# Patient Record
Sex: MALE | Race: WHITE | Employment: FULL TIME | URBAN - METROPOLITAN AREA
[De-identification: names, ages, dates, MRNs, and addresses within clinical notes are randomized per-mention and may not be internally consistent; named-entity substitution may affect disease eponyms.]

---

## 2019-04-18 ENCOUNTER — OFFICE VISIT (OUTPATIENT)
Dept: ORTHOPEDIC SURGERY | Facility: CLINIC | Age: 54
End: 2019-04-18

## 2019-04-18 VITALS
RESPIRATION RATE: 16 BRPM | HEART RATE: 94 BPM | HEIGHT: 73 IN | OXYGEN SATURATION: 94 % | SYSTOLIC BLOOD PRESSURE: 143 MMHG | BODY MASS INDEX: 29.77 KG/M2 | WEIGHT: 224.6 LBS | TEMPERATURE: 97.5 F | DIASTOLIC BLOOD PRESSURE: 98 MMHG

## 2019-04-18 DIAGNOSIS — S43.401A SPRAIN OF RIGHT SHOULDER, UNSPECIFIED SHOULDER SPRAIN TYPE, INITIAL ENCOUNTER: ICD-10-CM

## 2019-04-18 DIAGNOSIS — S73.101A SPRAIN OF RIGHT HIP, INITIAL ENCOUNTER: ICD-10-CM

## 2019-04-18 DIAGNOSIS — M25.552 ACUTE HIP PAIN, LEFT: ICD-10-CM

## 2019-04-18 DIAGNOSIS — M25.512 ACUTE PAIN OF LEFT SHOULDER: ICD-10-CM

## 2019-04-18 DIAGNOSIS — S43.402A SPRAIN OF LEFT SHOULDER, UNSPECIFIED SHOULDER SPRAIN TYPE, INITIAL ENCOUNTER: ICD-10-CM

## 2019-04-18 DIAGNOSIS — S13.9XXA NECK SPRAIN, INITIAL ENCOUNTER: ICD-10-CM

## 2019-04-18 DIAGNOSIS — S33.5XXA LUMBAR SPRAIN, INITIAL ENCOUNTER: ICD-10-CM

## 2019-04-18 DIAGNOSIS — S73.102A HIP SPRAIN, LEFT, INITIAL ENCOUNTER: ICD-10-CM

## 2019-04-18 DIAGNOSIS — M25.532 WRIST PAIN, LEFT: Primary | ICD-10-CM

## 2019-04-18 NOTE — LETTER
4/18/2019 3:15 PM 
 
Mr. Polo Aldana [address] [facility] [address] THE ABOVE PATIENT WAS SEEN TODAY AND MAY CONTINUE FULL WORK ACTIVITIES.  
 
 
Sincerely, 
 
 
Jairo Barker MD
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
## 2019-05-02 ENCOUNTER — HOSPITAL ENCOUNTER (OUTPATIENT)
Dept: PHYSICAL THERAPY | Age: 54
Discharge: HOME | End: 2019-05-02
Payer: COMMERCIAL

## 2019-05-02 PROCEDURE — 97110 THERAPEUTIC EXERCISES: CPT

## 2019-05-02 PROCEDURE — 97165 OT EVAL LOW COMPLEX 30 MIN: CPT

## 2019-05-02 PROCEDURE — 97035 APP MDLTY 1+ULTRASOUND EA 15: CPT
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
## 2019-05-08 ENCOUNTER — HOSPITAL ENCOUNTER (OUTPATIENT)
Dept: PHYSICAL THERAPY | Age: 54
Discharge: HOME | End: 2019-05-08
Payer: COMMERCIAL

## 2019-05-08 PROCEDURE — 97110 THERAPEUTIC EXERCISES: CPT

## 2019-05-08 PROCEDURE — 97140 MANUAL THERAPY 1/> REGIONS: CPT

## 2019-05-08 PROCEDURE — 97035 APP MDLTY 1+ULTRASOUND EA 15: CPT

## 2019-05-08 NOTE — PROGRESS NOTES
OT DAILY TREATMENT NOTE 10-18 Patient Name: Gino Corrales Date:2019 : 1965 [x]  Patient  Verified Payor: Yodit Seals / Plan: [address] [place] [address] Rd PT / Product Type: Commerical / In time:100  Out time:145 Total Treatment Time (min): 45 Visit #: 2 of 10 Treatment Area: Pain in left wrist [M25.532] SUBJECTIVE Pain Level (0-10 scale): 4-5/10 Any medication changes, allergies to medications, adverse drug reactions, diagnosis change, or new procedure performed?: [x] No    [] Yes (see summary sheet for update) Subjective functional status/changes:   [] No changes reported Numbness has decreased, still with sharp pain on thumb side of volar forearm running to elbow, mostly when lifting, sometimes out of nowhere OBJECTIVE Modality rationale: decrease inflammation and decrease pain to improve the patients ability to move wrist without pain Min Type Additional Details  
 [] Estim:  []Unatt       []IFC  []Premod []Other:  []w/ice   []w/heat Position: Location:  
 [] Estim: []Att    []TENS instruct  []NMES []Other:  []w/US   []w/ice   []w/heat Position: Location:  
 []  Traction: [] Cervical       []Lumbar 
                     [] Prone          []Supine []Intermittent   []Continuous Lbs: 
[] before manual 
[] after manual  
8 [x]  Ultrasound: []Continuous   [x] Pulsed 50%            []1MHz   [x]3MHz W/cm2:1.0 Location:volar wrist , thumb snuff left  
 []  Iontophoresis with dexamethasone Location: [] Take home patch  
[] In clinic  
 []  Ice     []  heat 
[]  Ice massage 
[]  Laser  
[]  Anodyne Position: Location:  
 []  Laser with stim 
[]  Other:  Position: Location:  
 []  Vasopneumatic Device Pressure:       [] lo [] med [] hi  
Temperature: [] lo [] med [] hi  
 
 
[x] Skin assessment post-treatment:  [x]intact []redness- no adverse reaction 
  []redness  adverse reaction: 29 min Therapeutic Exercise:  [] See flow sheet :  
Rationale: increase ROM to improve the patients ability to grasp Thumb ROM HEp 10 reps each Wrist mobilizer x 10 each way Wrist mazes 5x each 8 min Manual Therapy:  Instrument assisted forearm Rationale: decrease pain, increase ROM and increase tissue extensibility to improve pain free wrist and forearm ROM With 
 [] TE 
 [] TA 
 [] neuro 
 [] other: Patient Education: [x] Review HEP [] Progressed/Changed HEP based on: thumb HEP [] positioning   [] body mechanics   [] transfers   [] heat/ice application  
[] Splint wear/care   [] Sensory re-education   [] scar management     
[] other:   
 
     
Other Objective/Functional Measures:  
Numbness decreased Pain Level (0-10 scale) post treatment: 5-6/10 ASSESSMENT/Changes in Function: Fatigues with exercises Patient will continue to benefit from skilled OT services to modify and progress therapeutic interventions, address ROM deficits, address strength deficits, analyze and address soft tissue restrictions and instruct in home and community integration to attain remaining goals. []  See Plan of Care 
[]  See progress note/recertification 
[]  See Discharge Summary Progress towards goals / Updated goals: *1.  Patient will be independent in home exercise program for nerve gliding and wrist and thumb ROM. progressing thumb and nerve gliding 5/8/19 2. Patient will report pain in wrists reduced with use of modalities in clinic.not met today 5/8/19 3. Patient will report 50% decrease in episodes of numbness during tasks at hoem. 
  
Long Term Goals: To be accomplished in 10 treatments: 1.  Patient will report pain 0-2/10 with routine tasks. 2.  Patient will report improved use of hand for lifting and carrying as  shown by increase in FOTO of at least 15 points. 3.  Patient will improve left hand  atleast  
 
** PLAN 
 []  Upgrade activities as tolerated     [x]  Continue plan of care 
[]  Update interventions per flow sheet      
[]  Discharge due to:_ 
[]  Other:_ OTILIO Abel 5/8/2019  1:31 PM 
 
Future Appointments Date Time Provider Mariela Henderson 5/10/2019  2:45 PM Francisco Cunningham [facility]  
5/14/2019 10:30 AM KRISTINE Bryant/JESÚS [facility]

## 2019-05-10 ENCOUNTER — HOSPITAL ENCOUNTER (OUTPATIENT)
Dept: PHYSICAL THERAPY | Age: 54
Discharge: HOME | End: 2019-05-10
Payer: COMMERCIAL

## 2019-05-10 PROCEDURE — 97140 MANUAL THERAPY 1/> REGIONS: CPT

## 2019-05-10 PROCEDURE — 97035 APP MDLTY 1+ULTRASOUND EA 15: CPT

## 2019-05-10 PROCEDURE — 97110 THERAPEUTIC EXERCISES: CPT

## 2019-05-10 NOTE — PROGRESS NOTES
OT DAILY TREATMENT NOTE 10-18 Patient Name: Felisa Looney Date:5/10/2019 : 1965 [x]  Patient  Verified Payor: Theodore Cortezhire / Plan: [address] [place] [address] Rd PT / Product Type: Commerical / In time:245  Out time:330 Total Treatment Time (min): 45 Visit #: 3 of 10 Treatment Area: Pain in left wrist [M25.532] SUBJECTIVE Pain Level (0-10 scale): 5-6/10 Any medication changes, allergies to medications, adverse drug reactions, diagnosis change, or new procedure performed?: [x] No    [] Yes (see summary sheet for update) Subjective functional status/changes:   [] No changes reported Decrease in tingling per Patient report OBJECTIVE Modality rationale: decrease inflammation, decrease pain and increase tissue extensibility to improve the patients ability to  Min Type Additional Details  
 [] Estim:  []Unatt       []IFC  []Premod []Other:  []w/ice   []w/heat Position: Location:  
 [] Estim: []Att    []TENS instruct  []NMES []Other:  []w/US   []w/ice   []w/heat Position: Location:  
 []  Traction: [] Cervical       []Lumbar 
                     [] Prone          []Supine []Intermittent   []Continuous Lbs: 
[] before manual 
[] after manual  
8 [x]  Ultrasound: []Continuous   [x] Pulsed-50%                         []1MHz   [x]3MHz Location: [facility] 
W/cm2: 1.0 []  Iontophoresis with dexamethasone Location: [] Take home patch  
[] In clinic  
 []  Ice     []  heat 
[]  Ice massage 
[]  Laser  
[]  Anodyne Position: Location:  
 []  Laser with stim 
[]  Other: Position: Location:  
 []  Vasopneumatic Device Pressure:       [] lo [] med [] hi  
Temperature: [] lo [] med [] hi  
[x] Skin assessment post-treatment:  [x]intact []redness- no adverse reaction 
  []redness  adverse reaction:  
 
10 min Therapeutic Exercise:  [] See flow sheet :  
Rationale: increase ROM and increase strength to improve the patients ability to  Wrist Mazes: 5x each Reviewed HEP with Pt demo 15 min Manual Therapy:  IASTM Rationale: decrease pain, increase ROM, increase tissue extensibility and decrease edema  to improve ability to functionally use hand IASTM to [facility]/Forearm Trigger point release With 
 [] TE 
 [] TA 
 [] neuro 
 [] other: Patient Education: [x] Review HEP [] Progressed/Changed HEP based on:  
[] positioning   [] body mechanics   [] transfers   [] heat/ice application  
[] Splint wear/care   [] Sensory re-education   [] scar management     
[] other:   
 
     
Other Objective/Functional Measures:  
 
Decreased knot in forearm post IASTM Pain Level (0-10 scale) post treatment: 5-6/10 ASSESSMENT/Changes in Function: decrease with tingling sensation, no change in pain with modalities Patient will continue to benefit from skilled OT services to modify and progress therapeutic interventions, address ROM deficits, address strength deficits and instruct in home and community integration to attain remaining goals. []  See Plan of Care 
[]  See progress note/recertification 
[]  See Discharge Summary Progress towards goals / Updated goals: 1.  Patient will be independent in home exercise program for nerve gliding and wrist and thumb ROM. progressing thumb and nerve gliding 5/8/19 2.  Patient will report pain in wrists reduced with use of modalities in clinic.not met today 5/10/19 3.  Patient will report 50% decrease in episodes of numbness during tasks at [facility]. Met per Pt report, check for consistency 5/10/19 
  
Long Term Goals: To be accomplished in 10 treatments:             
1.  Patient will report pain 0-2/10 with routine tasks. 2.  Patient will report improved use of hand for lifting and carrying as  shown by increase in FOTO of at least 15 points. 3.  Patient will improve left hand  atleast  
 
 
 
PLAN 
[]  Upgrade activities as tolerated     [x]  Continue plan of care []  Update interventions per flow sheet      
[]  Discharge due to:_ 
[]  Other:_ AKUA Li 5/10/2019  1:48 PM 
 
Future Appointments Date Time Provider Mariela Henderson 5/14/2019 10:30 AM Gene Ray NJDEEDL [facility]  
5/17/2019 10:30 AM Gene Ray [id number] [facility]

## 2019-05-14 ENCOUNTER — HOSPITAL ENCOUNTER (OUTPATIENT)
Dept: PHYSICAL THERAPY | Age: 54
Discharge: HOME | End: 2019-05-14
Payer: COMMERCIAL

## 2019-05-14 PROCEDURE — 97110 THERAPEUTIC EXERCISES: CPT

## 2019-05-14 PROCEDURE — 97035 APP MDLTY 1+ULTRASOUND EA 15: CPT

## 2019-05-14 PROCEDURE — 97018 PARAFFIN BATH THERAPY: CPT

## 2019-05-14 NOTE — PROGRESS NOTES
OT DAILY TREATMENT NOTE 10-18 Patient Name: Salena Curling Date:2019 : 1965 [x]  Patient  Verified Payor: Yusuf Cuenca / Plan: [address] [place] [address] Rd PT / Product Type: Commerical / In time:1030  Out time:1125 Total Treatment Time (min): 55 Visit #: 4 of 10 Treatment Area: Pain in left wrist [M25.532] SUBJECTIVE Pain Level (0-10 scale): 3-10 Any medication changes, allergies to medications, adverse drug reactions, diagnosis change, or new procedure performed?: [x] No    [] Yes (see summary sheet for update) Subjective functional status/changes:   [] No changes reported I tried to do a push up and it didn't work out very well OBJECTIVE 
  
     
Modality rationale: decrease inflammation, decrease pain and increase tissue extensibility to improve the patients ability to  Min Type Additional Details  
  [] Estim:  []Unatt       []IFC  []Premod []Other:  []w/ice   []w/heat Position: Location:  
  [] Estim: []Att    []TENS instruct  []NMES []Other:  []w/US   []w/ice   []w/heat Position: Location:  
  []  Traction: [] Cervical       []Lumbar 
                     [] Prone          []Supine []Intermittent   []Continuous Lbs: 
[] before manual 
[] after manual  
8 [x]  Ultrasound: []Continuous   [x] Pulsed-50%                         []1MHz   [x]3MHz Location: [facility] 
W/cm2: 1.0  
  []  Iontophoresis with dexamethasone Location: [] Take home patch  
[] In clinic  
  []  Ice     []  heat 
[]  Ice massage 
[]  Laser  
[]  Anodyne Position: Location:  
  []  Laser with stim 
[]  Other: Position: Location:  
  []  Vasopneumatic Device Pressure:       [] lo [] med [] hi  
Temperature: [] lo [] med [] hi  
[x] Skin assessment post-treatment:  [x]intact []redness- no adverse reaction []redness  adverse reaction:  
  
30 min Therapeutic Exercise:  [] See flow sheet :  
 Rationale: increase ROM and increase strength to improve the patients ability to , grasp, lift Median Glides 5x Wrist Mzzes: 10x each Wrist Mobilizer: on Wall: 20x each way Review HEP for thumb Blue Therabar: Supination/Pronation 10x each, Flexion/Extension with Green Therabar 10x each TRX: Modified Pushups 5x 10 min Manual Therapy:  IASTM Rationale: decrease pain, increase ROM, increase tissue extensibility and decrease trigger points to improve  IASTM to [facility]/Wrist/Thumb/Forearm With 
 [] TE 
 [] TA 
 [] neuro 
 [] other: Patient Education: [x] Review HEP [] Progressed/Changed HEP based on:  
[] positioning   [] body mechanics   [] transfers   [] heat/ice application  
[] Splint wear/care   [] Sensory re-education   [] scar management     
[] other:   
 
     
Other Objective/Functional Measures:  
 
Reported pain with flexion/extension with Blue therabar Pain Level (0-10 scale) post treatment: 5/10 ASSESSMENT/Changes in Function: strength improving Patient will continue to benefit from skilled OT services to modify and progress therapeutic interventions, address ROM deficits, address strength deficits and instruct in home and community integration to attain remaining goals. []  See Plan of Care 
[]  See progress note/recertification 
[]  See Discharge Summary Progress towards goals / Updated goals: 1.  Patient will be independent in home exercise program for nerve gliding and wrist and thumb ROM. Met 5/14/19 2.  Patient will report pain in wrists reduced with use of modalities in clinic.not met today 5/10/19, 5/14/19 3.  Patient will report 50% decrease in episodes of numbness during tasks at [facility] Met per Pt report, check for consistency 5/10/19 
  
Long Term Goals: To be accomplished in 10 treatments:             
1.  Patient will report pain 0-2/10 with routine tasks.  
2.  Patient will report improved use of hand for lifting and carrying as  shown by increase in FOTO of at least 15 points. 3.  Patient will improve left hand  atleast  
 
 
 
PLAN 
[]  Upgrade activities as tolerated     [x]  Continue plan of care 
[]  Update interventions per flow sheet      
[]  Discharge due to:_ 
[]  Other:_ AKUA Crain 5/14/2019  8:05 AM 
 
Future Appointments Date Time Provider Mariela Henderson 5/14/2019 10:30 AM Momo Liriano [id number] [facility]  
5/17/2019 10:30 AM Momo [id number] [facility]

## 2019-05-17 ENCOUNTER — HOSPITAL ENCOUNTER (OUTPATIENT)
Dept: PHYSICAL THERAPY | Age: 54
Discharge: HOME | End: 2019-05-17
Payer: COMMERCIAL

## 2019-05-17 ENCOUNTER — APPOINTMENT (OUTPATIENT)
Dept: PHYSICAL THERAPY | Age: 54
End: 2019-05-17
Payer: COMMERCIAL

## 2019-05-17 PROCEDURE — 97110 THERAPEUTIC EXERCISES: CPT

## 2019-05-17 PROCEDURE — 97140 MANUAL THERAPY 1/> REGIONS: CPT

## 2019-05-17 PROCEDURE — 97035 APP MDLTY 1+ULTRASOUND EA 15: CPT

## 2019-05-21 ENCOUNTER — HOSPITAL ENCOUNTER (OUTPATIENT)
Dept: PHYSICAL THERAPY | Age: 54
Discharge: HOME | End: 2019-05-21
Payer: COMMERCIAL

## 2019-05-21 PROCEDURE — 97140 MANUAL THERAPY 1/> REGIONS: CPT

## 2019-05-21 PROCEDURE — 97110 THERAPEUTIC EXERCISES: CPT

## 2019-05-21 PROCEDURE — 97032 APPL MODALITY 1+ESTIM EA 15: CPT

## 2019-05-21 NOTE — PROGRESS NOTES
OT DAILY TREATMENT NOTE 10-18    Patient Name: Jennifer Burnham  Date:2019  : 1965  [x]  Patient  Verified  Payor: Hetal Rome / Plan: [address] [place] [address] Rd PT / Product Type: Commerical /    In time:1036  Out time:1115  Total Treatment Time (min): 39  Visit #: 6 of 10      Treatment Area: Pain in left wrist [M25.532]    SUBJECTIVE  Pain Level (0-10 scale): 3-410  Any medication changes, allergies to medications, adverse drug reactions, diagnosis change, or new procedure performed?: [x] No    [] Yes (see summary sheet for update)  Subjective functional status/changes:   [] No changes reported  Pt reports having some pain due to hard physical labor completed day prior    OBJECTIVE    Modality rationale: decrease inflammation, decrease pain and increase tissue extensibility to improve the patients ability to    Min Type Additional Details    [] Estim:  []Unatt       []IFC  []Premod                        []Other:  []w/ice   []w/heat  Position:  Location:    [] Estim: []Att    []TENS instruct  []NMES                    []Other:  []w/US   []w/ice   []w/heat  Position:  Location:    []  Traction: [] Cervical       []Lumbar                       [] Prone          []Supine                       []Intermittent   []Continuous Lbs:  [] before manual  [] after manual    []  Ultrasound: []Continuous   [] Pulsed                           []1MHz   []3MHz Location:  W/cm2:    []  Iontophoresis with dexamethasone         Location: [] Take home patch   [] In clinic    []  Ice     []  heat  []  Ice massage  []  Laser   []  Anodyne Position:  Location:   8 [x]  Laser with stim  []  Other: Position:  Location: Volar Wrist    []  Vasopneumatic Device Pressure:       [] lo [] med [] hi   Temperature: [] lo [] med [] hi   [x] Skin assessment post-treatment:  [x]intact []redness- no adverse reaction    []redness  adverse reaction:     15 min Therapeutic Exercise:  [] See flow sheet :   Rationale: increase ROM and increase strength to improve the patients ability to     Wrist Mazes: 10x each  Sue Douse: 3 ways: 15x       8 min Manual Therapy:  IASTM   Rationale: decrease pain, increase ROM and increase tissue extensibility to improve ability to use Right wrist/hand    IASTM to volar wrist/forearm           With   [x] TE   [] TA   [] neuro   [] other: Patient Education: [x] Review HEP    [] Progressed/Changed HEP based on:   [x] positioning   [x] body mechanics   [] transfers   [] heat/ice application   [] Splint wear/care   [] Sensory re-education   [] scar management      [] other:            Other Objective/Functional Measures:     inflammation present at volar wrist      Pain Level (0-10 scale) post treatment: 3/10    ASSESSMENT/Changes in Function: Strength improvement     Patient will continue to benefit from skilled OT services to modify and progress therapeutic interventions, address ROM deficits, address strength deficits and instruct in home and community integration to attain remaining goals. []  See Plan of Care  []  See progress note/recertification  []  See Discharge Summary         Progress towards goals / Updated goals:  1.  Patient will be independent in home exercise program for nerve gliding and wrist and thumb ROM. Met 5/14/19  2.  Patient will report pain in wrists reduced with use of modalities in clinic.not met today 5/10/19, 5/14/19  3.  Patient will report 50% decrease in episodes of numbness during tasks at [facility]. Met per Pt report 5/17/19     Long Term Goals: To be accomplished in 10 treatments:              1.  Patient will report pain 0-2/10 with routine tasks. 2.  Patient will report improved use of hand for lifting and carrying as  shown by increase in FOTO of at least 15 points.   3.  Patient will improve left hand  atleast 10# for lifting items at work Progressing with in clinic activities 5/21/19      PLAN  []  Upgrade activities as tolerated     []  Continue plan of care  []  Update interventions per flow sheet       []  Discharge due to:_  []  Other:_      OLGA LIDIA Nascimento/L 5/21/2019  8:32 AM    Future Appointments   Date Time Provider Mariela Henderson   5/21/2019 10:30 AM Americo Faulkner [id number] [facility]   5/23/2019  2:45 PM Americo Faulkner FRHSPIK [facility]   5/29/2019  4:00 PM Americo Faulkner [id number] [facility]   5/31/2019  8:00 AM Radha Calix OT [id number] [facility]   6/3/2019  4:00 PM Americo Faulkner [id number] [facility]

## 2019-05-23 ENCOUNTER — HOSPITAL ENCOUNTER (OUTPATIENT)
Dept: PHYSICAL THERAPY | Age: 54
Discharge: HOME | End: 2019-05-23
Payer: COMMERCIAL

## 2019-05-23 PROCEDURE — 97032 APPL MODALITY 1+ESTIM EA 15: CPT

## 2019-05-23 PROCEDURE — 97110 THERAPEUTIC EXERCISES: CPT

## 2019-05-23 PROCEDURE — 97140 MANUAL THERAPY 1/> REGIONS: CPT

## 2019-05-23 NOTE — PROGRESS NOTES
OT DAILY TREATMENT NOTE 10-18    Patient Name: Paulina Lester  Date:2019  : 1965  [x]  Patient  Verified  Payor: Unknown Ades / Plan: [address] / Product Type: Commerical /    In time:245  Out time:320  Total Treatment Time (min): 35  Visit #: 7 of 10    Treatment Area: Pain in left wrist [M25.532]    SUBJECTIVE  Pain Level (0-10 scale): 3/10  Any medication changes, allergies to medications, adverse drug reactions, diagnosis change, or new procedure performed?: [x] No    [] Yes (see summary sheet for update)  Subjective functional status/changes:   [] No changes reported  I tried to do a push up. It didn't feel good.     OBJECTIVE    Modality rationale: decrease inflammation, decrease pain and increase tissue extensibility to improve the patients ability to    Min Type Additional Details     [] Estim:  []Unatt       []IFC  []Premod                        []Other:  []w/ice   []w/heat  Position:  Location:     [] Estim: []Att    []TENS instruct  []NMES                    []Other:  []w/US   []w/ice   []w/heat  Position:  Location:     []  Traction: [] Cervical       []Lumbar                       [] Prone          []Supine                       []Intermittent   []Continuous Lbs:  [] before manual  [] after manual     []  Ultrasound: []Continuous   [] Pulsed                           []1MHz   []3MHz Location:  W/cm2:     []  Iontophoresis with dexamethasone         Location: [] Take home patch   [] In clinic     []  Ice     []  heat  []  Ice massage  []  Laser   []  Anodyne Position:  Location:   8 [x]  Laser with stim  []  Other: Position:  Location: Volar Wrist     []  Vasopneumatic Device Pressure:       [] lo [] med [] hi   Temperature: [] lo [] med [] hi   [x] Skin assessment post-treatment:  [x]intact []redness- no adverse reaction  []redness  adverse reaction:     17 min Therapeutic Exercise:  [] See flow sheet :   Rationale: increase ROM and increase strength to improve the patients ability to     Zebedee Shree: 3x 10 no flexion/extension  Firm Theraputty: 10/10      10 min Manual Therapy:  IASTM   Rationale: decrease pain, increase ROM and increase tissue extensibility to     IASTM to volar wrist/Radial side wrist             With   [] TE   [] TA   [] neuro   [] other: Patient Education: [x] Review HEP    [] Progressed/Changed HEP based on:   [] positioning   [] body mechanics   [] transfers   [] heat/ice application   [] Splint wear/care   [] Sensory re-education   [] scar management      [] other:            Other Objective/Functional Measures:     Increase in pain only with flexion/extension with therabar      Pain Level (0-10 scale) post treatment: 3/10    ASSESSMENT/Changes in Function: strength improving     Patient will continue to benefit from skilled OT services to modify and progress therapeutic interventions, address ROM deficits, address strength deficits and instruct in home and community integration to attain remaining goals. []  See Plan of Care  []  See progress note/recertification  []  See Discharge Summary         Progress towards goals / Updated goals:  1.  Patient will be independent in home exercise program for nerve gliding and wrist and thumb ROM. Met 5/14/19  2.  Patient will report pain in wrists reduced with use of modalities in clinic.not met today 5/10/19, 5/14/19  3.  Patient will report 50% decrease in episodes of numbness during tasks at [facility]. Met per Pt report 5/17/19     Long Term Goals: To be accomplished in 10 treatments:              1.  Patient will report pain 0-2/10 with routine tasks. 2.  Patient will report improved use of hand for lifting and carrying as  shown by increase in FOTO of at least 15 points.   3.  Patient will improve left hand  atleast 10# for lifting items at work Progressing with in clinic activities 5/23/19        PLAN  []  Upgrade activities as tolerated     [x]  Continue plan of care  []  Update interventions per flow sheet       []  Discharge due to:_  []  Other:_      OLGA LIDIA Nascimento/L 5/23/2019  1:38 PM    Future Appointments   Date Time Provider Mariela Henderson   5/23/2019  2:45 PM Americo Faulkner [id number] [facility]   5/29/2019  4:00 PM Americo Faulkner [id number] [facility]   5/31/2019  8:00 AM Radha Calix OT [id number] [facility]   6/3/2019  4:00 PM Americo Faulkner [id number] [facility]

## 2019-05-29 ENCOUNTER — HOSPITAL ENCOUNTER (OUTPATIENT)
Dept: PHYSICAL THERAPY | Age: 54
Discharge: HOME | End: 2019-05-29
Payer: COMMERCIAL

## 2019-05-29 PROCEDURE — 97032 APPL MODALITY 1+ESTIM EA 15: CPT

## 2019-05-29 PROCEDURE — 97110 THERAPEUTIC EXERCISES: CPT

## 2019-05-29 NOTE — PROGRESS NOTES
OT DAILY TREATMENT NOTE 10-18    Patient Name: Davidson Fan  Date:2019  : 1965  [x]  Patient  Verified  Payor: Lluvia Comment / Plan: [address] / Product Type: Commerical /    In time:357  Out time:433  Total Treatment Time (min): 36  Visit #: 8 of 10      Treatment Area: Pain in left wrist [M25.532]    SUBJECTIVE  Pain Level (0-10 scale): 2/10  Any medication changes, allergies to medications, adverse drug reactions, diagnosis change, or new procedure performed?: [x] No    [] Yes (see summary sheet for update)  Subjective functional status/changes:   [] No changes reported  Pt has new wrist compression sleeve.     OBJECTIVE    Modality rationale: decrease inflammation, decrease pain and increase tissue extensibility to improve the patients ability to    Min Type Additional Details     [] Estim:  []Unatt       []IFC  []Premod                        []Other:  []w/ice   []w/heat  Position:  Location:     [] Estim: []Att    []TENS instruct  []NMES                    []Other:  []w/US   []w/ice   []w/heat  Position:  Location:     []  Traction: [] Cervical       []Lumbar                       [] Prone          []Supine                       []Intermittent   []Continuous Lbs:  [] before manual  [] after manual     []  Ultrasound: []Continuous   [] Pulsed                           []1MHz   []3MHz Location:  W/cm2:     []  Iontophoresis with dexamethasone         Location: [] Take home patch   [] In clinic     []  Ice     []  heat  []  Ice massage  []  Laser   []  Anodyne Position:  Location:   8 [x]  Laser with stim  []  Other: Position:  Location: Volar Wrist     []  Vasopneumatic Device Pressure:       [] lo [] med [] hi   Temperature: [] lo [] med [] hi   [x] Skin assessment post-treatment:  [x]intact []redness- no adverse reaction  []redness  adverse reaction:       25 min Therapeutic Exercise:  [] See flow sheet :   Rationale: increase ROM and increase strength to improve the patients ability to     Ramona Klinefelter: 3 ways: 2x15  [employer]: 15x, bow, lumb, grasp  Firm Putty: 10/10          With   [] TE   [] TA   [] neuro   [] other: Patient Education: [x] Review HEP    [] Progressed/Changed HEP based on:   [] positioning   [] body mechanics   [] transfers   [] heat/ice application   [] Splint wear/care   [] Sensory re-education   [] scar management      [] other:            Other Objective/Functional Measures:     Increased fatigue at end of session      Pain Level (0-10 scale) post treatment: 0/10    ASSESSMENT/Changes in Function: strength improving     Patient will continue to benefit from skilled OT services to modify and progress therapeutic interventions, address ROM deficits, address strength deficits and instruct in home and community integration to attain remaining goals. []  See Plan of Care  []  See progress note/recertification  []  See Discharge Summary         Progress towards goals / Updated goals:    1.  Patient will be independent in home exercise program for nerve gliding and wrist and thumb ROM. Met 5/14/19  2.  Patient will report pain in wrists reduced with use of modalities in clinic.not met today 5/10/19, 5/14/19  3.  Patient will report 50% decrease in episodes of numbness during tasks at [facility]. Met per Pt report 5/17/19     Long Term Goals: To be accomplished in 10 treatments:              1.  Patient will report pain 0-2/10 with routine tasks. Progressing 5/29/19  2.  Patient will report improved use of hand for lifting and carrying as  shown by increase in FOTO of at least 15 points.   3.  Patient will improve left hand  atleast 10# for lifting items at work Progressing with in clinic activities 5/23/19      PLAN  []  Upgrade activities as tolerated     [x]  Continue plan of care  []  Update interventions per flow sheet       []  Discharge due to:_  []  Other:_      AKUA Phillips 5/29/2019  2:30 PM    Future Appointments   Date Time Provider Department [place]   5/29/2019  4:00 PM Rosanna Grant [id number] [facility]   5/31/2019  8:00 AM Mic Thompson OT [id number] [facility]   6/3/2019  4:00 PM Rosanna Grant [id number] [facility]

## 2019-05-31 ENCOUNTER — HOSPITAL ENCOUNTER (OUTPATIENT)
Dept: PHYSICAL THERAPY | Age: 54
Discharge: HOME | End: 2019-05-31
Payer: COMMERCIAL

## 2019-05-31 PROCEDURE — 97110 THERAPEUTIC EXERCISES: CPT

## 2019-05-31 PROCEDURE — 97032 APPL MODALITY 1+ESTIM EA 15: CPT

## 2019-05-31 NOTE — PROGRESS NOTES
OT DISCHARGE DAILY NOTE AND SUMMARY     Date:2019  Patient name: Yunier Tucker Start of Care: 2019   Referral source: Lizzie Barraza MD : 1965   Medical/Treatment Diagnosis: Pain in left wrist [M25.532] Onset Date:19     Prior Hospitalization: see medical history Provider#: [id number]   Medications: Verified on Patient Summary List     Comorbidities: pain in right wrist,    Prior Level of Function: powder coating, I self care home care driving, yard work  Visits from [facility] Care: 9    Missed Visits: 0    Reporting Period : 2019 to 2019  [x]  Patient  Verified  Payor: [employer] / Plan: [address] PT / Product Type: Commerical /    In time:10:30 AM  Out time:11:30 AM  Total Treatment Time (min): 60  Visit #: 9 of 10    Treatment Area: Pain in left wrist [M25.532]    SUBJECTIVE  Pain Level (0-10 scale): 2-3/10  Any medication changes, allergies to medications, adverse drug reactions, diagnosis change, or new procedure performed?: [x] No    [] Yes (see summary sheet for update)  Subjective functional status/changes:   [] No changes reported  \"I haven't done anything with the wrist using the brace. \"    OBJECTIVE    Modality rationale: decrease inflammation, decrease pain and increase tissue extensibility to improve the patients ability to    Min Type Additional Details    [] Estim:  []Unatt       []IFC  []Premod                        []Other:  []w/ice   []w/heat  Position:  Location:    [] Estim: []Att    []TENS instruct  []NMES                    []Other:  []w/US   []w/ice   []w/heat  Position:  Location:    []  Traction: [] Cervical       []Lumbar                       [] Prone          []Supine                       []Intermittent   []Continuous Lbs:  [] before manual  [] after manual    []  Ultrasound: []Continuous   [] Pulsed                           []1MHz   []3MHz W/cm2:  Location:    []  Iontophoresis with dexamethasone         Location: [] Take home patch [] In clinic    []  Ice     []  heat  []  Ice massage  []  Laser   []  Anodyne Position:  Location:   8 [x]  Laser with stim  []  Other:  Position:  Location: volar wrist    []  Vasopneumatic Device Pressure:       [] lo [] med [] hi   Temperature: [] lo [] med [] hi   [] Skin assessment post-treatment:  []intact []redness- no adverse reaction    []redness  adverse reaction:     52 min Therapeutic Exercise:  [x] See flow sheet :   Rationale: increase ROM, increase strength and improve coordination to improve the patients ability to return to normal daily tasks. With   [] TE   [] TA   [] neuro   [] other: Patient Education: [x] Review HEP    [] Progressed/Changed HEP based on:   [] positioning   [] body mechanics   [] transfers   [] heat/ice application   [] Splint wear/care   [] Sensory re-education   [] scar management      [] other:            Other Objective/Functional Measures:  Measurements: Taken with Hemanth Dynamometer, in Lbs   Level 2 5/31/2019 Date Date Date Date Date Date   Right 125               Left 105               Deficit                 Change                    Range of Motion:   Elbow/Wrist   Wrist 5/31/2019   Right / Left            Flex 76/75           Ext 65/60           UD 30/30           RD 20/18                Pain Level (0-10 scale) post treatment: 0/10    Summary of Care:  1.  Patient will be independent in home exercise program for nerve gliding and wrist and thumb ROM. Met 5/14/19  2.  Patient will report pain in wrists reduced with use of modalities in clinic. Goal Met 5/31/19  3.  Patient will report 50% decrease in episodes of numbness during tasks at home. Met per Pt report 5/17/19   Long Term Goals: To be accomplished in 10 treatments:              1.  Patient will report pain 0-2/10 with routine tasks. Progressing 5/29/19  2.  Patient will report improved use of hand for lifting and carrying as  shown by increase in FOTO of at least 15 points.  5/31/19 - (+15 points) Goal Met  3.  Patient will improve left hand  atleast 10# for lifting items at work Progressing with in clinic activities 5/23/19; 5/31/19 - (+27) 105# Goal Met    ASSESSMENT/Changes in Function: Improved bilateral wrist AROM. Pt continues to report pain with pushing tasks. He has been provided and instructed in wrist isometrics.     PLAN:  [x]Discontinue therapy: [x]Patient has reached or is progressing toward set goals      []Patient is non-compliant or has abdicated      []Due to lack of appreciable progress towards set goals    Thank you for this referral!    Jo Burton OT 5/31/2019 11:31 AM              Future Appointments   Date Time Provider Mariela Henderson   5/31/2019 10:30 AM Wolfgang Merlin, OT [id number]   6/3/2019  4:00 PM Conrad Taveras

## 2019-05-31 NOTE — PROGRESS NOTES
[facility] [address] [facility]  [phone] fax  Patient Name: Yosvany Toscano  Date:2019  : 1965  [x]  Patient  Verified      Hand Therapy/ Occupational Therapy Discharge Instructions        Continue with home exercise program for 2-3 times a day for 2-4 weeks then decrease to 1-2 times a day as needed/patient discretion. Continue to apply hot pack as needed 1-2 per day as needed. Follow up with MD: as needed      Recommendations: _x___ Return to activity with home program            ____ Return to activity with the following modifcations                       ____ Post Rehab Program                                  ____Return to/Join local gym    Additional comments: wear wrist brace for weightbearing functional tasks as needed        Please call with any questions or concerns. Thank you for your participation.          Key Polo OT 2019  11:21 AM

## 2019-06-03 ENCOUNTER — APPOINTMENT (OUTPATIENT)
Dept: PHYSICAL THERAPY | Age: 54
End: 2019-06-03

## 2019-08-19 ENCOUNTER — TELEPHONE (OUTPATIENT)
Dept: ORTHOPEDIC SURGERY | Facility: CLINIC | Age: 54
End: 2019-08-19

## 2019-08-19 NOTE — TELEPHONE ENCOUNTER
Patient to request work note changed to return to work date of June 14th. Reason for request is due physical therapy was not completed until May 31st.  At the end of that, Physical Therapist said they could either request more additional PT for patient or he could do 2 weeks of home exercise program before returning to work. Patient chose Home Exercise Program for 2 more weeks, which ended on June 14th. 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 Patient returned to full work on Monday, June 17th. Please call patient when note can be picked-up at Cell number [phone].

## 2019-08-19 NOTE — TELEPHONE ENCOUNTER
[facility] for note to return to full work 6-17-19--note written--ready at [facility]

## 2020-03-02 NOTE — PROGRESS NOTES
OT DAILY TREATMENT NOTE 10-18 Patient Name: Valentino Hugo Date:2019 : 1965 [x]  Patient  Verified Payor: Og Curtis / Plan: [address] [place] [address] Rd PT / Product Type: Commerical / In time:1031  Out time:1115 Total Treatment Time (min): 44 Visit #: 5 of 10 Treatment Area: Pain in left wrist [M25.532] SUBJECTIVE Pain Level (0-10 scale): 3-4/10 Any medication changes, allergies to medications, adverse drug reactions, diagnosis change, or new procedure performed?: [x] No    [] Yes (see summary sheet for update) Subjective functional status/changes:   [] No changes reported Pt reports decreased tingling/numbness, but also reports difficulty with endurance OBJECTIVE 
 
         
Modality rationale: decrease inflammation, decrease pain and increase tissue extensibility to improve the patients ability to  Min Type Additional Details  
  [] Estim:  []Unatt       []IFC  []Premod      
                 []Other:  []w/ice   []w/heat Position: Location:  
  [] Estim: []Att    []TENS instruct  []NMES   
                []Other:  []w/US   []w/ice   []w/heat Position: Location:  
  []  Traction: [] Cervical       []Lumbar 
                     [] Prone          []Supine 
                     []Intermittent   []Continuous Lbs: 
[] before manual 
[] after manual  
8 [x]  Ultrasound: []Continuous   [x] Pulsed-50%                         []1MHz   [x]3MHz Location: [facility] 
W/cm2: 1.0  
  []  Iontophoresis with dexamethasone  
      Location: [] Take home patch  
[] In clinic  
  []  Ice     []  heat 
[]  Ice massage []  Laser []  Anodyne Position: Location:  
  []  Laser with stim 
[]  Other: Position: Location:  
  []  Vasopneumatic Device Pressure:       [] lo [] med [] hi  
Temperature: [] lo [] med [] hi  
[x] Skin assessment post-treatment:  [x]intact []redness- no adverse reaction []redness  adverse reaction:  
  
24 min Therapeutic Exercise:  [] See flow sheet :  
 Rationale: increase ROM and increase strength to improve the patients ability to , grasp, lift 
  
 
Wrist Mazzes: 10x each Wrist Mobilizer: on Wall: 20x each way Blue Therabar: Supination/Pronation 15x 10 min Manual Therapy:  IASTM Rationale: decrease pain, increase ROM, increase tissue extensibility and decrease trigger points to improve  
  
IASTM to [facility]/Wrist/Thumb/Forearm With 
 [] TE 
 [] TA 
 [] neuro 
 [] other: Patient Education: [x] Review HEP [] Progressed/Changed HEP based on:  
[] positioning   [] body mechanics   [] transfers   [] heat/ice application  
[] Splint wear/care   [] Sensory re-education   [] scar management     
[] other:   
 
     
Other Objective/Functional Measures:  
 
Pain with flexion/extension when using therabar Pain Level (0-10 scale) post treatment: 3-4/10 ASSESSMENT/Changes in Function: strength improving Patient will continue to benefit from skilled OT services to modify and progress therapeutic interventions, address ROM deficits, address strength deficits and instruct in home and community integration to attain remaining goals. []  See Plan of Care 
[]  See progress note/recertification 
[]  See Discharge Summary Progress towards goals / Updated goals: 1.  Patient will be independent in home exercise program for nerve gliding and wrist and thumb ROM. Met 5/14/19 2.  Patient will report pain in wrists reduced with use of modalities in clinic.not met today 5/10/19, 5/14/19 3.  Patient will report 50% decrease in episodes of numbness during tasks at hoem. Met per Pt report 5/17/19 
  
Long Term Goals: To be accomplished in 10 treatments:             
1.  Patient will report pain 0-2/10 with routine tasks. 2.  Patient will report improved use of hand for lifting and carrying as  shown by increase in FOTO of at least 15 points.  
3.  Patient will improve left hand  atleast  
 
 
 
PLAN 
 []  Upgrade activities as tolerated     [x]  Continue plan of care 
[]  Update interventions per flow sheet      
[]  Discharge due to:_ 
[]  Other:_ OLGA LIDIA Soriano/L 5/17/2019  10:59 AM 
 
Future Appointments Date Time Provider Mariela Henderson 5/21/2019 10:30 AM Demetria Segal [id number] [facility]  
5/23/2019  2:45 PM Demetria Segal [id number] [facility]  
5/29/2019  4:00 PM Demetria Segal [id number] [facility]  
5/31/2019  8:00 AM Michaela Cosme OT [id number] [facility]  
6/3/2019  4:00 PM Demetria Segal [id number] [facility]  
 
 
 
 Yes...